# Patient Record
Sex: MALE | Race: OTHER | Employment: UNEMPLOYED | URBAN - METROPOLITAN AREA
[De-identification: names, ages, dates, MRNs, and addresses within clinical notes are randomized per-mention and may not be internally consistent; named-entity substitution may affect disease eponyms.]

---

## 2017-10-24 ENCOUNTER — APPOINTMENT (OUTPATIENT)
Dept: OCCUPATIONAL MEDICINE | Age: 22
End: 2017-10-24
Attending: EMERGENCY MEDICINE

## 2018-02-07 ENCOUNTER — APPOINTMENT (OUTPATIENT)
Dept: OCCUPATIONAL MEDICINE | Age: 23
End: 2018-02-07
Attending: PEDIATRICS

## 2023-01-10 ENCOUNTER — APPOINTMENT (OUTPATIENT)
Dept: GENERAL RADIOLOGY | Age: 28
End: 2023-01-10
Attending: NURSE PRACTITIONER
Payer: COMMERCIAL

## 2023-01-10 ENCOUNTER — APPOINTMENT (OUTPATIENT)
Dept: ULTRASOUND IMAGING | Age: 28
End: 2023-01-10
Attending: NURSE PRACTITIONER
Payer: COMMERCIAL

## 2023-01-10 ENCOUNTER — HOSPITAL ENCOUNTER (OUTPATIENT)
Age: 28
Discharge: HOME | End: 2023-01-10
Payer: COMMERCIAL

## 2023-01-10 VITALS
TEMPERATURE: 99 F | HEART RATE: 91 BPM | SYSTOLIC BLOOD PRESSURE: 161 MMHG | OXYGEN SATURATION: 99 % | RESPIRATION RATE: 18 BRPM | DIASTOLIC BLOOD PRESSURE: 101 MMHG

## 2023-01-10 DIAGNOSIS — M25.571 ACUTE RIGHT ANKLE PAIN: ICD-10-CM

## 2023-01-10 DIAGNOSIS — M79.605 LEFT LEG PAIN: Primary | ICD-10-CM

## 2023-01-10 DIAGNOSIS — R03.0 ELEVATED BLOOD PRESSURE READING: ICD-10-CM

## 2023-01-10 DIAGNOSIS — S72.91XA CLOSED FRACTURE OF RIGHT FEMUR, UNSPECIFIED FRACTURE MORPHOLOGY, UNSPECIFIED PORTION OF FEMUR, INITIAL ENCOUNTER (HCC): ICD-10-CM

## 2023-01-10 PROCEDURE — 73552 X-RAY EXAM OF FEMUR 2/>: CPT | Performed by: NURSE PRACTITIONER

## 2023-01-10 PROCEDURE — 73502 X-RAY EXAM HIP UNI 2-3 VIEWS: CPT | Performed by: NURSE PRACTITIONER

## 2023-01-10 PROCEDURE — 99214 OFFICE O/P EST MOD 30 MIN: CPT | Performed by: NURSE PRACTITIONER

## 2023-01-10 PROCEDURE — 96372 THER/PROPH/DIAG INJ SC/IM: CPT | Performed by: NURSE PRACTITIONER

## 2023-01-10 PROCEDURE — 73610 X-RAY EXAM OF ANKLE: CPT | Performed by: NURSE PRACTITIONER

## 2023-01-10 PROCEDURE — 93970 EXTREMITY STUDY: CPT | Performed by: NURSE PRACTITIONER

## 2023-01-10 RX ORDER — CYCLOBENZAPRINE HCL 10 MG
10 TABLET ORAL 2 TIMES DAILY PRN
Qty: 20 TABLET | Refills: 0 | Status: SHIPPED | OUTPATIENT
Start: 2023-01-10

## 2023-01-10 RX ORDER — KETOROLAC TROMETHAMINE 30 MG/ML
30 INJECTION, SOLUTION INTRAMUSCULAR; INTRAVENOUS ONCE
Status: COMPLETED | OUTPATIENT
Start: 2023-01-10 | End: 2023-01-10

## 2023-01-10 RX ORDER — IBUPROFEN 600 MG/1
600 TABLET ORAL EVERY 6 HOURS PRN
Qty: 40 TABLET | Refills: 0 | Status: SHIPPED | OUTPATIENT
Start: 2023-01-10

## 2023-01-10 RX ORDER — LIDOCAINE 50 MG/G
1 PATCH TOPICAL EVERY 24 HOURS
Qty: 15 PATCH | Refills: 0 | Status: SHIPPED | OUTPATIENT
Start: 2023-01-10

## 2023-01-10 NOTE — ED QUICK NOTES
Patient left the IC in stable condition via PV to [facility] IC for CT. Instructed patient to go straight to facility. She verbalized understanding of instructions given.

## 2023-01-10 NOTE — ED INITIAL ASSESSMENT (HPI)
Pt reports left leg pain (knee up to hip) beginning about Tuesday. No injury or trauma. Hx of rickets as child- has metal bassem in leg. Pt also reports right ankle pain since Tuesday. Recently finished prednisone for gout. No injury or trauma. Took tylenol this morning with minimal relief.

## 2023-01-10 NOTE — DISCHARGE INSTRUCTIONS
Follow-up with Dr. Antwan Blacwkell for primary care, rechecking of your blood pressure and annual exam and blood work. They will determine if you actually do have hypertension and if this needs to be treated. Follow-up with Dr. Dominique Lang and orthopedic office for continued evaluation and pain control. They will determine the next best steps for your right incidental femur fracture noted on x-ray  Use crutches especially when at home to get around. Take Flexeril 2 times a day as needed for pain. This is a muscle relaxer that may cause dizziness or drowsiness. If you are working during the day only take this at night before bedtime. Use Lidoderm patches to area of most pain 12 hours on and 12 hours off    Take ibuprofen 600 mg every 6 hours for pain and swelling. Avoid heavy lifting, strenuous activities. Go to ER for worsening pain, loss of sensation or mobility to legs, dizziness, chest pain or shortness of breath.

## 2023-01-11 ENCOUNTER — TELEPHONE (OUTPATIENT)
Dept: ORTHOPEDICS CLINIC | Facility: CLINIC | Age: 28
End: 2023-01-11

## 2023-01-11 NOTE — TELEPHONE ENCOUNTER
Per pt was seen at [facility] yesterday, has femur fracture, needs appt soon. Please advise thank you.

## 2023-01-11 NOTE — TELEPHONE ENCOUNTER
S/w pt and he states he started having pain in LLE about 1 wk ago. He went to  on 1/10/23 and they did XRs and found a fx in the right femur. He denies any pain in the RLE. Scheduled appt on 1/12 @ 11am with Dr Vijay Fajardo. Address given.

## 2023-01-12 ENCOUNTER — OFFICE VISIT (OUTPATIENT)
Dept: ORTHOPEDICS CLINIC | Facility: CLINIC | Age: 28
End: 2023-01-12

## 2023-01-12 DIAGNOSIS — M16.7 OTHER SECONDARY OSTEOARTHRITIS OF LEFT HIP: ICD-10-CM

## 2023-01-12 DIAGNOSIS — M84.351A STRESS FRACTURE OF SHAFT OF RIGHT FEMUR, INITIAL ENCOUNTER: Primary | ICD-10-CM

## 2023-01-12 PROCEDURE — 99204 OFFICE O/P NEW MOD 45 MIN: CPT | Performed by: ORTHOPAEDIC SURGERY

## 2024-02-05 ENCOUNTER — HOSPITAL ENCOUNTER (OUTPATIENT)
Age: 29
Discharge: HOME | End: 2024-02-05
Payer: COMMERCIAL

## 2024-02-05 ENCOUNTER — APPOINTMENT (OUTPATIENT)
Dept: GENERAL RADIOLOGY | Age: 29
End: 2024-02-05
Attending: NURSE PRACTITIONER
Payer: COMMERCIAL

## 2024-02-05 VITALS
SYSTOLIC BLOOD PRESSURE: 173 MMHG | HEART RATE: 85 BPM | DIASTOLIC BLOOD PRESSURE: 101 MMHG | OXYGEN SATURATION: 97 % | RESPIRATION RATE: 14 BRPM | TEMPERATURE: 99 F

## 2024-02-05 DIAGNOSIS — S62.657A CLOSED NONDISPLACED FRACTURE OF MIDDLE PHALANX OF LEFT LITTLE FINGER, INITIAL ENCOUNTER: ICD-10-CM

## 2024-02-05 DIAGNOSIS — M79.643 PAIN, HAND: Primary | ICD-10-CM

## 2024-02-05 PROCEDURE — A4570 SPLINT: HCPCS | Performed by: NURSE PRACTITIONER

## 2024-02-05 PROCEDURE — 99213 OFFICE O/P EST LOW 20 MIN: CPT | Performed by: NURSE PRACTITIONER

## 2024-02-05 PROCEDURE — 73130 X-RAY EXAM OF HAND: CPT | Performed by: NURSE PRACTITIONER

## 2024-02-05 NOTE — DISCHARGE INSTRUCTIONS
REFER TO HANDOUT FOR FURTHER DISCHARGE CARE.  -Take medications as directed for pain relief. Take tylenol and/or ibuprofen for pain and fever  -Apply ice or heat to Left pinky finger to relieve pain.    -Non-weight bearing until you follow up in ortho clinic  -Remove splint immediately if your finger is  cold, turning blue, decreased capillary refill,  increased pain out of proportion to your injury

## 2024-02-12 ENCOUNTER — OFFICE VISIT (OUTPATIENT)
Dept: SURGERY | Facility: CLINIC | Age: 29
End: 2024-02-12

## 2024-02-12 DIAGNOSIS — S62.647A CLOSED NONDISPLACED FRACTURE OF PROXIMAL PHALANX OF LEFT LITTLE FINGER, INITIAL ENCOUNTER: Primary | ICD-10-CM

## 2024-02-12 PROCEDURE — 99204 OFFICE O/P NEW MOD 45 MIN: CPT | Performed by: PLASTIC SURGERY

## 2024-02-12 PROCEDURE — 29125 APPL SHORT ARM SPLINT STATIC: CPT | Performed by: OCCUPATIONAL THERAPIST

## 2024-02-12 NOTE — H&P
Jacoby Yoder Jr. is a 28 year old male that presents with   Chief Complaint   Patient presents with    Fracture     lEFT SMALL FINGER    .    REFERRED BY:  Grecia Falk    Pacemaker: No  Latex Allergy: no  Coumadin: No  Plavix: No  Other anticoagulants: No  Diet medication: No  Cardiac stents: No    HAND DOMINANCE:  Right    Profession:      RECONSTRUCTIVE HISTORY    SUN EXPOSURE   Current yes   Past yes   Sunburns no   Tanning salons current no   Tanning salons past no     SKIN CANCER    Personal history of skin cancer: none      HPI:       Injury 1: LSF PP cortical fracture  - Date: 02/04/24  - Days Since: 8      28-year-old male right-hand-dominant with LSF fracture    Fell on his outstretched hand  Immediate care following day, x-rayed and splinted    No pain            Review of Systems:   Constitutional: No change in appetite, chill/rigors, or fatigue  GI: No jaundice  Endocrine: No generalized weakness  Neurological: No aphasia, loss of consciousness, or seizures    Musculoskeletal:      Date of injury 2/4/24     Location left      small finger      Mechanism Mechanical fall      Treatment Seen in immediate care on 2/5, x-ray performed, splinted       Pain  no     Numbness None      PMH:     MEDICAL  Past Medical History:   Diagnosis Date    Essential hypertension     Gout     Rickets         SURGICAL  History reviewed. No pertinent surgical history.     ALLERIGIES  No Known Allergies     MEDICATIONS  Current Outpatient Medications   Medication Sig Dispense Refill    cyclobenzaprine 10 MG Oral Tab Take 1 tablet (10 mg total) by mouth 2 (two) times daily as needed for Muscle spasms. (Patient not taking: Reported on 2/12/2024) 20 tablet 0    ibuprofen 600 MG Oral Tab Take 1 tablet (600 mg total) by mouth every 6 (six) hours as needed for Pain or Fever. (Patient not taking: Reported on 2/12/2024) 40 tablet 0        SOCIAL HISTORY  Social History     Socioeconomic History    Marital status:     Tobacco Use    Smoking status: Never    Smokeless tobacco: Never   Substance and Sexual Activity    Alcohol use: Yes     Comment: socially    Drug use: Never        FAMILY HISTORY  History reviewed. No pertinent family history.       PHYSICAL EXAM:     CONSTITUTIONAL: Overall appearance - Normal  HEENT: Normocephalic  EYES: Conjunctiva - Right: Normal, Left: Normal; EOMI  EARS: Inspection - Right: Normal, Left: Normal  NECK/THYROID: Inspection - Normal, Palpation - Normal, Thyroid gland - Normal, No adenopathy  RESPIRATORY: Inspection - Normal, Effort - Normal  CARDIOVASCULAR: Regular rhythm, No murmurs  ABDOMEN: Inspection - Normal, No abdominal tenderness  NEURO: Memory intact  PSYCH: Oriented to person, place, time, and situation, Appropriate mood and affect      Hand Physical Exam:     LSF ecchymosis and edema with limited range of motion  FDS, FDP, central slip, terminal slip intact  PIP/DIP   RCL/UCL intact    No rotation on flexion    X-ray independently interpreted: Cortical fracture proximal phalanx base    ASSESSMENT/PLAN:     Fracture: LSF proximal phalanx base, cortical fracture    We discussed the fracture/dislocation and the treatment options.  Questions were answered and the patient wishes to proceed with treatment.     SPLINT - This fracture can be treated with a splint.  We discussed splint care and instructions, as well as restrictions.  If there is displacement of the fracture, surgery may be required.    Ulnar gutter splint    1 week start active, passive, strengthening and resistance  2 weeks discontinue splint, back to work full duty, 2/26    We discussed restrictions.    Even with satisfactory healing, the hand/digit may not regain normal ROM or normal function.              2/12/2024  Manjeet Schneider MD      +++++++++++++++++++++++++++++++++++++++++      MEDICAL DECISION MAKING    PROBLEMS      MODERATE    (number / complexity)          Acute complicated  injury    DATA         MODERATE    (amount / complexity)          X-rays independently reviewed    MANAGEMENT RISK  LOW    (complications/ morbidity)       Splint/OT                  MDM LEVEL    MODERATE

## 2024-02-13 NOTE — PROGRESS NOTES
Subjective: I hurt my LSF.      Objective:     Current level of performance:  ADL: Independent  Work: 2  lifting restriction with the left hand.  Leisure: Not addressed    Measurements/Tests:  ROM:      N/A            Treatment Provided this day: Fabricated a left Ulnar Gutter Splint per order.    Treatment Time: 30 minutes      Summary/Analysis of Treatment session: Tolerated the fabrication of a left ulnar gutter splint well.      Plan: To be compliant with the wear and care of left ulnar gutter splint x 2 weeks:      Follow up in:  x 1 week.          Richar VALLECILLO/L

## 2024-02-20 ENCOUNTER — OFFICE VISIT (OUTPATIENT)
Dept: SURGERY | Facility: CLINIC | Age: 29
End: 2024-02-20

## 2024-02-20 DIAGNOSIS — M25.642 STIFFNESS OF JOINT, HAND, LEFT: ICD-10-CM

## 2024-02-20 DIAGNOSIS — M62.81 DISTAL MUSCLE WEAKNESS: Primary | ICD-10-CM

## 2024-02-20 PROCEDURE — 97166 OT EVAL MOD COMPLEX 45 MIN: CPT | Performed by: OCCUPATIONAL THERAPIST

## 2024-02-20 NOTE — PROGRESS NOTES
OCCUPATIONAL THERAPY EVALUATION:   Jacoby Yoder Jr.   [id number]       SUBJECTIVE:    HX of Injury: LSF PP Cortical Fracture.  Chief Complaint:  Minimal discomfort of the LSF.    Precautions:  2 # lifting restriction with the left hand.  Premorbid Functional Status: Independent w/ Occ. duties, Independent w/ driving / sitting, Independent w/ ADL's  Current Level of Function: On restricted work duty.  Employment: Working restricted duty  Hand Dominance: right  Living Situation:  Not addressed  Barriers to Learning: None  Patient Goals: To return to full work duty.    Imaging/Tests: X-ray        OBJECTIVE DATA:   PAIN:   Rating (1/10): 1/10 at rest, 2/10 with activity  Location:       ORTHOTICS:    Left Ulnar Gutter splint for x 1 more week.      AROM/PROM:  (Degrees)  LEFT HAND:    Thumb IF MF RF SF   MP     80   PIP     80   DIP     40   TANNER     200 degrees of TANNER             STRENGTH: (lbs) Right Average Left Average   : NT NT   2 pt Pinch:     3 pt Pinch:     Lateral Pinch:         ASSESSMENT & PLAN OF CARE:    Treatment Provided: Patient was seen for an initial evaluation, hand washing :  HEP:  AROM, Tendon glides, x 20 reps per set, x 5 sets daily. Reviewed hand elevation importance. Written handout was provided to reinforce today's treatment and educational session.       Rehabilitation Potential: Good    CLINICAL ASSESSMENT:    Patient/Caregiver Education Provided: Yes    Treatment Plan:  Therapeutic Exercise  Therapeutic Activities  Modalities  Patient/Family Education  Splinting: Left Ulnar Gutter Splint.    GOALS:  Short term goals to be reached in x 1 week:    1) Independent with HEP..    Long term goals to be reached by 02/26/2024:    1) 1) Full functional use of the involved extremity for self-care, leisure and work related tasks:        Patient will be seen 1 x /week for 3 weeks or a total of 3 visits.   Pt. was advised regarding the findings of this evaluation and agrees to the plan of care.      DARRYL Lynch     I have reviewed the treatment plan and concur.   Manjeet Schneider MD

## 2024-04-26 ENCOUNTER — HOSPITAL ENCOUNTER (OUTPATIENT)
Age: 29
Discharge: HOME | End: 2024-04-26
Payer: COMMERCIAL

## 2024-04-26 VITALS
TEMPERATURE: 98 F | SYSTOLIC BLOOD PRESSURE: 175 MMHG | RESPIRATION RATE: 16 BRPM | HEART RATE: 88 BPM | DIASTOLIC BLOOD PRESSURE: 122 MMHG | OXYGEN SATURATION: 99 %

## 2024-04-26 DIAGNOSIS — M79.671 FOOT PAIN, RIGHT: Primary | ICD-10-CM

## 2024-04-26 DIAGNOSIS — R03.0 ELEVATED BLOOD PRESSURE READING: ICD-10-CM

## 2024-04-26 PROCEDURE — 99213 OFFICE O/P EST LOW 20 MIN: CPT | Performed by: NURSE PRACTITIONER

## 2024-04-26 NOTE — ED INITIAL ASSESSMENT (HPI)
Pt complaining of right ankle pain with flexion and walking since yesterday.  No trauma. Pt took ibuprofen today and now does not have pain.

## 2024-04-26 NOTE — ED PROVIDER NOTES
Patient Seen in: [facility]    History   CC: ankle/foot pain  HPI: Jacoby Yoder . 29 year old male w/ HTN, and hx of surgical correction for rickets who presents c/o right anterior foot/ankle pain which started after getting up early morning today. Increased with ambulation and to the touch. Denies pain at present.  Denies known injury/trauma.  Denies numbness, tingling, weakness or limitation in range of motion associated.    Past Medical History:    Essential hypertension    Gout    Rickets       History reviewed. No pertinent surgical history.    History reviewed. No pertinent family history.    Social History     Socioeconomic History    Marital status:    Tobacco Use    Smoking status: Never    Smokeless tobacco: Never   Substance and Sexual Activity    Alcohol use: Yes     Comment: socially    Drug use: Never     Social Determinants of Health     Financial Resource Strain: Not on File (10/7/2022)    Received from [facility]     Financial Resource Strain     Financial Resource Strain: 0   Food Insecurity: Not on File (10/7/2022)    Received from [facility]     Food Insecurity     Food: 0   Transportation Needs: Not on File (10/7/2022)    Received from [facility]     Transportation Needs     Transportation: 0   Physical Activity: Not on File (10/7/2022)    Received from [facility]     Physical Activity     Physical Activity: 0   Stress: Not on File (10/7/2022)    Received from [facility]     Stress     Stress: 0   Social Connections: Not on File (10/7/2022)    Received from [facility]     Social Connections     Social Connections and Isolation: 0   Housing Stability: Not on File (10/7/2022)    Received from [facility]     Housing Stability     Housin       ROS:  Review of Systems    Positive for stated complaint: Ankle Pain  Other systems are as noted in HPI.  Constitutional and vital signs reviewed.      All other systems reviewed and negative except as noted  above.    PSFH elements reviewed from today and agreed except as otherwise stated in HPI.             Constitutional and vital signs reviewed.        Physical Exam     ED Triage Vitals [04/26/24 1152]   BP (!) 175/122   Pulse 88   Resp 16   Temp 97.5 °F (36.4 °C)   Temp src Temporal   SpO2 99 %   O2 Device None (Room air)       Current:BP (!) 175/122   Pulse 88   Temp 97.5 °F (36.4 °C) (Temporal)   Resp 16   SpO2 99%         PE:  General - Appears well, non-toxic and in NAD  Head - Appears symmetrical without deformity/swelling cranium, scalp, or facial bones  Resp - Lung sounds clear bilaterally and wob unlabored, good aeration with equal, even expansion bilaterally   CV - RRR  Skin - no rashes or petechiae noted, pink warm and dry throughout, mmm, cap refill <2seconds  Neuro - A&O x4, sensation equal to both medial and lateral aspects of lower extremities, steady gait  MSK - makes purposeful movements of lower extremities with full ROM noted, foot press/dorsiflexion strength equal bilat, pedal pulses 2+ bilat.  There is no reproducible tenderness elicited with palpation diffuse to the right foot, ankle, shin, calf, knee or thigh  Psych - Interactive and appropriate      ED Course   Labs Reviewed - No data to display    MDM     DDx: Sprain versus strain versus contusion versus gout versus fracture    Patient initially to immediate care for foot pain which occurred this morning and resolved prior to arrival.  Did take Motrin earlier this morning.  Offered x-ray which patient declines.  There is no obvious sign of infection, swelling and no tenderness to palpation on exam.  No reproducible pain at present.  Patient is noted to have high blood pressure however at immediate care.  States he has been told this in the past however does not have a primary care provider.  He is asymptomatic of hypertension at this time.  Declines ED visit for blood pressure reduction.  Agreeable to close outpatient follow-up.  First  available appointment Monday morning at 8:40 AM.  This provider called to secure this appointment.  Patient is agreeable.  Strict ED precautions prior to this appointment reviewed.  Patient is historian and demonstrates understanding of all instruction and agrees with plan of care.      Disposition and Plan     Clinical Impression:  1. Foot pain, right    2. Elevated blood pressure reading        Disposition:  Discharge    Follow-up:  Jonas Trevizo MD  [address] [place] [id number]  [phone]    Go on 4/29/2024  0840 - arrive by 0825      Medications Prescribed:  Current Discharge Medication List

## 2024-04-26 NOTE — DISCHARGE INSTRUCTIONS
Follow-up with your appointment with internal medicine as previously made on Monday.  Arrive by 825 and have your insurance card with you.  Bring photo ID.  Seek reevaluation sooner in the emergency room for any new or worsening symptoms, dizziness, vision changes, weakness, vomiting or headaches.

## 2024-04-29 ENCOUNTER — OFFICE VISIT (OUTPATIENT)
Dept: INTERNAL MEDICINE CLINIC | Facility: CLINIC | Age: 29
End: 2024-04-29

## 2024-04-29 ENCOUNTER — LAB ENCOUNTER (OUTPATIENT)
Dept: LAB | Age: 29
End: 2024-04-29
Attending: STUDENT IN AN ORGANIZED HEALTH CARE EDUCATION/TRAINING PROGRAM
Payer: COMMERCIAL

## 2024-04-29 VITALS
BODY MASS INDEX: 27.09 KG/M2 | HEIGHT: 60 IN | WEIGHT: 138 LBS | DIASTOLIC BLOOD PRESSURE: 91 MMHG | SYSTOLIC BLOOD PRESSURE: 148 MMHG | HEART RATE: 76 BPM

## 2024-04-29 DIAGNOSIS — I15.9 SECONDARY HYPERTENSION: ICD-10-CM

## 2024-04-29 DIAGNOSIS — Z00.00 ANNUAL PHYSICAL EXAM: ICD-10-CM

## 2024-04-29 DIAGNOSIS — R63.4: ICD-10-CM

## 2024-04-29 DIAGNOSIS — R50.9 FEVER OF UNKNOWN ORIGIN (FUO): ICD-10-CM

## 2024-04-29 DIAGNOSIS — F10.20 ALCOHOL USE DISORDER, SEVERE, DEPENDENCE (HCC): ICD-10-CM

## 2024-04-29 DIAGNOSIS — M90.80: ICD-10-CM

## 2024-04-29 DIAGNOSIS — M10.9 GOUT INVOLVING TOE, UNSPECIFIED CAUSE, UNSPECIFIED CHRONICITY, UNSPECIFIED LATERALITY: ICD-10-CM

## 2024-04-29 DIAGNOSIS — E83.31: ICD-10-CM

## 2024-04-29 DIAGNOSIS — Z23 IMMUNIZATION DUE: ICD-10-CM

## 2024-04-29 DIAGNOSIS — G47.00 INSOMNIA, UNSPECIFIED TYPE: ICD-10-CM

## 2024-04-29 DIAGNOSIS — Z23 NEED FOR TETANUS, DIPHTHERIA, AND ACELLULAR PERTUSSIS (TDAP) VACCINE: ICD-10-CM

## 2024-04-29 DIAGNOSIS — Z76.89 ENCOUNTER TO ESTABLISH CARE WITH NEW DOCTOR: Primary | ICD-10-CM

## 2024-04-29 PROBLEM — R73.03 PRE-DIABETES: Status: ACTIVE | Noted: 2024-04-29

## 2024-04-29 PROBLEM — R73.03 PRE-DIABETES: Status: RESOLVED | Noted: 2024-04-29 | Resolved: 2024-04-29

## 2024-04-29 LAB
ALBUMIN SERPL-MCNC: 4.5 G/DL (ref 3.2–4.8)
ALBUMIN/GLOB SERPL: 1.6 {RATIO} (ref 1–2)
ALP LIVER SERPL-CCNC: 95 U/L
ALT SERPL-CCNC: 52 U/L
ANION GAP SERPL CALC-SCNC: 6 MMOL/L (ref 0–18)
AST SERPL-CCNC: 35 U/L (ref ?–34)
ATRIAL RATE: 65 BPM
BASOPHILS # BLD AUTO: 0.06 X10(3) UL (ref 0–0.2)
BASOPHILS NFR BLD AUTO: 0.8 %
BILIRUB SERPL-MCNC: 1.1 MG/DL (ref 0.3–1.2)
BILIRUB UR QL: NEGATIVE
BUN BLD-MCNC: 18 MG/DL (ref 9–23)
BUN/CREAT SERPL: 17.6 (ref 10–20)
CALCIUM BLD-MCNC: 9.8 MG/DL (ref 8.7–10.4)
CARTRIDGE EXPIRATION DATE: NORMAL DATE
CHLORIDE SERPL-SCNC: 106 MMOL/L (ref 98–112)
CLARITY UR: CLEAR
CO2 SERPL-SCNC: 27 MMOL/L (ref 21–32)
COLOR UR: YELLOW
CREAT BLD-MCNC: 1.02 MG/DL
CREAT UR-SCNC: 181.9 MG/DL
DEPRECATED RDW RBC AUTO: 39.8 FL (ref 35.1–46.3)
EGFRCR SERPLBLD CKD-EPI 2021: 101 ML/MIN/1.73M2 (ref 60–?)
EOSINOPHIL # BLD AUTO: 0.39 X10(3) UL (ref 0–0.7)
EOSINOPHIL NFR BLD AUTO: 5.2 %
ERYTHROCYTE [DISTWIDTH] IN BLOOD BY AUTOMATED COUNT: 11.6 % (ref 11–15)
FASTING STATUS PATIENT QL REPORTED: YES
GLOBULIN PLAS-MCNC: 2.8 G/DL (ref 2.8–4.4)
GLUCOSE BLD-MCNC: 94 MG/DL (ref 70–99)
GLUCOSE UR-MCNC: 70 MG/DL
HCT VFR BLD AUTO: 51.3 %
HEMOGLOBIN A1C: 4.6 % (ref 4.3–5.6)
HGB BLD-MCNC: 18.9 G/DL
IGA SERPL-MCNC: 298.9 MG/DL (ref 40–350)
IGM SERPL-MCNC: 97.4 MG/DL (ref 50–300)
IMM GRANULOCYTES # BLD AUTO: 0.07 X10(3) UL (ref 0–1)
IMM GRANULOCYTES NFR BLD: 0.9 %
IMMUNOGLOBULIN PNL SER-MCNC: 800 MG/DL (ref 650–1600)
KETONES UR-MCNC: NEGATIVE MG/DL
LEUKOCYTE ESTERASE UR QL STRIP.AUTO: NEGATIVE
LYMPHOCYTES # BLD AUTO: 1.33 X10(3) UL (ref 1–4)
LYMPHOCYTES NFR BLD AUTO: 17.9 %
MCH RBC QN AUTO: 34.3 PG (ref 26–34)
MCHC RBC AUTO-ENTMCNC: 36.8 G/DL (ref 31–37)
MCV RBC AUTO: 93.1 FL
MICROALBUMIN UR-MCNC: 244.3 MG/DL
MICROALBUMIN/CREAT 24H UR-RTO: 1343 UG/MG (ref ?–30)
MONOCYTES # BLD AUTO: 0.81 X10(3) UL (ref 0.1–1)
MONOCYTES NFR BLD AUTO: 10.9 %
NEUTROPHILS # BLD AUTO: 4.78 X10 (3) UL (ref 1.5–7.7)
NEUTROPHILS # BLD AUTO: 4.78 X10(3) UL (ref 1.5–7.7)
NEUTROPHILS NFR BLD AUTO: 64.3 %
NITRITE UR QL STRIP.AUTO: NEGATIVE
OSMOLALITY SERPL CALC.SUM OF ELEC: 290 MOSM/KG (ref 275–295)
P AXIS: 53 DEGREES
P-R INTERVAL: 138 MS
PH UR: 6.5 [PH] (ref 5–8)
PLATELET # BLD AUTO: 238 10(3)UL (ref 150–450)
POTASSIUM SERPL-SCNC: 3.9 MMOL/L (ref 3.5–5.1)
PROT SERPL-MCNC: 7.3 G/DL (ref 5.7–8.2)
PROT UR-MCNC: 300 MG/DL
PTH-INTACT SERPL-MCNC: 123.9 PG/ML (ref 18.5–88)
Q-T INTERVAL: 378 MS
QRS DURATION: 82 MS
QTC CALCULATION (BEZET): 393 MS
R AXIS: 50 DEGREES
RBC # BLD AUTO: 5.51 X10(6)UL
SODIUM SERPL-SCNC: 139 MMOL/L (ref 136–145)
SP GR UR STRIP: 1.02 (ref 1–1.03)
T AXIS: 52 DEGREES
TSI SER-ACNC: 1.22 MIU/ML (ref 0.55–4.78)
URATE SERPL-MCNC: 8.6 MG/DL
UROBILINOGEN UR STRIP-ACNC: NORMAL
VENTRICULAR RATE: 65 BPM
VIT D+METAB SERPL-MCNC: 9.4 NG/ML (ref 30–100)
WBC # BLD AUTO: 7.4 X10(3) UL (ref 4–11)

## 2024-04-29 PROCEDURE — 84443 ASSAY THYROID STIM HORMONE: CPT

## 2024-04-29 PROCEDURE — 93005 ELECTROCARDIOGRAM TRACING: CPT

## 2024-04-29 PROCEDURE — 80053 COMPREHEN METABOLIC PANEL: CPT

## 2024-04-29 PROCEDURE — 84166 PROTEIN E-PHORESIS/URINE/CSF: CPT

## 2024-04-29 PROCEDURE — 82570 ASSAY OF URINE CREATININE: CPT

## 2024-04-29 PROCEDURE — 36415 COLL VENOUS BLD VENIPUNCTURE: CPT

## 2024-04-29 PROCEDURE — 87389 HIV-1 AG W/HIV-1&-2 AB AG IA: CPT | Performed by: STUDENT IN AN ORGANIZED HEALTH CARE EDUCATION/TRAINING PROGRAM

## 2024-04-29 PROCEDURE — 87040 BLOOD CULTURE FOR BACTERIA: CPT | Performed by: STUDENT IN AN ORGANIZED HEALTH CARE EDUCATION/TRAINING PROGRAM

## 2024-04-29 PROCEDURE — 93010 ELECTROCARDIOGRAM REPORT: CPT | Performed by: INTERNAL MEDICINE

## 2024-04-29 PROCEDURE — 84550 ASSAY OF BLOOD/URIC ACID: CPT

## 2024-04-29 PROCEDURE — 84100 ASSAY OF PHOSPHORUS: CPT

## 2024-04-29 PROCEDURE — 83521 IG LIGHT CHAINS FREE EACH: CPT

## 2024-04-29 PROCEDURE — 86334 IMMUNOFIX E-PHORESIS SERUM: CPT

## 2024-04-29 PROCEDURE — 82784 ASSAY IGA/IGD/IGG/IGM EACH: CPT

## 2024-04-29 PROCEDURE — 83970 ASSAY OF PARATHORMONE: CPT

## 2024-04-29 PROCEDURE — 82043 UR ALBUMIN QUANTITATIVE: CPT

## 2024-04-29 PROCEDURE — 86480 TB TEST CELL IMMUN MEASURE: CPT | Performed by: STUDENT IN AN ORGANIZED HEALTH CARE EDUCATION/TRAINING PROGRAM

## 2024-04-29 PROCEDURE — 86335 IMMUNFIX E-PHORSIS/URINE/CSF: CPT

## 2024-04-29 PROCEDURE — 82306 VITAMIN D 25 HYDROXY: CPT

## 2024-04-29 PROCEDURE — 81001 URINALYSIS AUTO W/SCOPE: CPT

## 2024-04-29 PROCEDURE — 84165 PROTEIN E-PHORESIS SERUM: CPT

## 2024-04-29 PROCEDURE — 84156 ASSAY OF PROTEIN URINE: CPT

## 2024-04-29 PROCEDURE — 85025 COMPLETE CBC W/AUTO DIFF WBC: CPT

## 2024-04-29 RX ORDER — NALTREXONE HYDROCHLORIDE 50 MG/1
50 TABLET, FILM COATED ORAL NIGHTLY
Qty: 90 TABLET | Refills: 1 | Status: SHIPPED | OUTPATIENT
Start: 2024-04-29 | End: 2024-10-26

## 2024-04-29 RX ORDER — LOSARTAN POTASSIUM 100 MG/1
100 TABLET ORAL NIGHTLY
Qty: 90 TABLET | Refills: 0 | Status: SHIPPED | OUTPATIENT
Start: 2024-04-29 | End: 2024-07-28

## 2024-04-29 NOTE — PATIENT INSTRUCTIONS
What Is High Blood Pressure?  High blood pressure (hypertension) is known as the silent killer. This is because most of the time it doesn’t cause symptoms. In fact, many people don’t know they have it until other health problems develop.  High blood pressure is diagnosed when blood pressure readings taken at several different times show levels higher than 130/80 mmHg. Healthy changes can help lower blood pressure. But once you have high blood pressure, you'll need to manage it for the rest of your life.  Understanding blood pressure  The circulatory system is made up of the heart and blood vessels that carry blood through the body. Your heart is the pump for this system. With each heartbeat (contraction), the heart sends blood out through large blood vessels called arteries. Blood pressure is a measure of how hard the moving blood pushes against the walls of the arteries.  High blood pressure can harm your health  In a healthy artery, the blood moves smoothly and puts normal pressure on its walls.    High blood pressure results from blood pushing too hard against artery walls. This damages the walls, which form scar tissue as they heal. The scar tissue makes the arteries stiff and weak. Plaque also sticks to the scar tissue, making arteries narrower and harder..  High blood pressure:  Causes your heart to work harder to get blood out to the rest of your body  Raises your risk for heart attack, heart failure, and stroke  Can lead to kidney disease and blindness  Measuring blood pressure  It's important to know your blood pressure numbers. A blood pressure reading is given as two numbers, such as 120/70. The top number is the pressure of blood against the artery walls during a heartbeat (systolic). The bottom number is the pressure of blood against artery walls between heartbeats (diastolic).  Blood pressure categories are:  Normal: lower than 120/lower than 80 mmHg  Elevated (prehypertensive): 120-139/80-89  mmHg  High, Stage 1: 130-139/80-89 mmHg  High, Stage 2: 140/90 mmHg and higher  For most people with high blood pressure, keeping readings lower than 130/80 mmHg may help prevent health problems. Talk with your healthcare provider to find out what your blood pressure goals should be. Bring up any questions or concerns you have about your readings.  Controlling blood pressure  If your blood pressure is too high, work with your healthcare provider to make a plan for lowering it. They may prescribe medicine to help control your blood pressure if lifestyle changes aren't enough.  Below are changes you can make to help lower your blood pressure:  Choose heart-healthy foods. Ask your provider about the Dietary Approaches to Stop Hypertension (DASH) eating plan. DASH limits sodium (salt) and offers lots of fruits and vegetables, low-fat or nonfat dairy products, whole grains, and other foods high in fiber and low in fat. This plan also includes an increased amount of potassium, which can help lower blood pressure.  Reduce sodium. Eating less sodium reduces fluid retention. Fluid retention caused by too much salt increases blood volume and blood pressure. The American Heart Association (AHA) says to have no more than 1,500 mg sodium a day. But because Americans eat so much salt, the AHA says cutting back to even 2,300 mg a day offers benefits.  Stay at a healthy weight. Being overweight makes you more likely to have high blood pressure. Losing excess weight helps lower blood pressure.  Exercise regularly. Daily exercise helps your heart and blood vessels work better and stay healthier. It can help lower your blood pressure.  Do not smoke. Smoking increases blood pressure and damages blood vessels.  Limit alcohol. Drinking too much alcohol can raise blood pressure. Men should have no more than 2 drinks a day. Women should have no more than 1 a day. A drink is equal to 1 beer, a small glass of wine, or a shot of  liquor.  Control stress. Stress makes your heart work harder and beat faster. Managing stress in a healthy way helps you control your blood pressure.  Facts about high blood pressure  Feeling OK doesn't mean your blood pressure is under control. Likewise, feeling bad doesn’t mean it’s out of control. The only way to know for sure is to check your pressure regularly.  Medicine is only one part of controlling high blood pressure. You also need to manage your weight, get regular exercise, and adjust your eating habits.  High blood pressure is often a lifelong health problem. But it can be controlled with lifestyle changes and medicine.  Hypertension isn't the same as stress. Although stress may be a factor in high blood pressure, it’s only one part of the story.  Blood pressure medicines need to be taken every day. Stopping suddenly may cause a dangerous increase in pressure.    [employer] last reviewed this educational content on 2/1/2022 © 2000-2023 [employer]. All rights reserved. This information is not intended as a substitute for professional medical care. Always follow your healthcare professional's instructions.        What Is a Normal Blood Pressure?  The Joint National Committee on Prevention, Detection, Evaluation, and Treatment of High Blood Pressure has classified blood pressure measurements into several categories:  Normal blood pressure is systolic pressure less than 120 and diastolic pressure less than 80 mmHg.   \"Prehypertension\" is systolic pressure of 120-139 or diastolic pressure of 80-89 mmHg.   Stage 1 Hypertension is blood pressure greater than systolic pressure of 140-159 or diastolic pressure of 90-99 mmHg or greater.   Stage 2 Hypertension is systolic pressure of 160 or greater or diastolic pressure of 100 or greater.  What Health Problems Are Associated With High Blood Pressure?  Several potentially serious health conditions are linked to high blood pressure,  including:  Atherosclerosis: a disease of the arteries caused by a buildup of plaque, or fatty material, on the inside walls of the blood vessels; hypertension contributes to this buildup by putting added stress and force on the artery walls.   Heart Disease: Heart failure (the heart is not strong enough to pump blood adequately), ischemic heart disease (the heart tissue doesn't get enough blood), and hypertensive hypertrophic cardiomyopathy (thickened, abnormally functioning heart muscle) are all associated with high blood pressure.   Kidney Disease: Hypertension can damage the blood vessels and filters in the kidneys, so that the kidneys cannot excrete waste properly.   Stroke: Hypertension can lead to stroke, either by contributing to the process of atherosclerosis (which can lead to blockages and/or clots), or by weakening the blood vessel wall and causing it to rupture.   Eye Disease: Hypertension can damage the very small blood vessels in the retina.  Bleeding from the aorta, the large blood vessel that supplies blood to the abdomen, pelvis, and legs   Heart failure   Poor blood supply to the legs  Erectile Dysfunction  Problems with your vision    Overview  \"Blood pressure\" is the force of blood pushing against the walls of the arteries as the heart pumps blood. If this pressure rises and stays high over time, it can damage the body in many ways.  About 1 in 3 adults in the [place] has HBP. The condition itself usually has no signs or symptoms. You can have it for years without knowing it. During this time, though, HBP can damage your heart, blood vessels, kidneys, and other parts of your body.  Knowing your blood pressure numbers is important, even when you're feeling fine. If your blood pressure is normal, you can work with your health care team to keep it that way. If your blood pressure is too high, treatment may help prevent damage to your body's organs.    By [facility] staff   DASH stands for  Dietary Approaches to Stop Hypertension. The DASH diet is a lifelong approach to healthy eating that's designed to help treat or prevent high blood pressure (hypertension). The DASH diet encourages you to reduce the sodium in your diet and eat a variety of foods rich in nutrients that help lower blood pressure, such as potassium, calcium and magnesium.   Lifestyle Modification:   (AVG. SBP = Average Systolic Blood Pressure)  Lifestyle Modification Recommendations  Modification Recommendation Avg. SBP Reduction Range   Weight Reduction Maintain normal body weight (body mass index 18.5-24.9 kg/m2) 5-20 mmHg/10 kg   DASH eating plan Adopt a diet rich in fruits, vegetables, and low fat dairy products with reduced content of saturated and total fat. 8-14 mmHg   Dietary sodium reduction Reduce dietary sodium intake to <= 100 mmol per day (2.4 g sodium or 6 g sodium chloride) 2-8 mmHg   Aerobic physical activity Regular aerobic physical activity (e.g., brisk walking, light jogging, cycling, swimming, etc.) for a goal of at least 150 minutes per week. 4-9 mmHg   Moderation of alcohol consumption Men: limit to <= 2 drinks* per day.  Women and lighter weight persons: limit to <= 1 drink* per day. 2-4 mmHg      * 1 drink = ½ oz or 15 mL ethanol (e.g. 12 oz beer, 5 oz wine, 1.5 oz 80-proof whiskey.   Effects are dose and time dependent.

## 2024-04-29 NOTE — PROGRESS NOTES
History of Present Illness   Patient ID: Jacoby Yoder Jr. is a 29 year old male.  Chief Complaint: Hypertension, Weight Loss (Pt states in last 2 weeks has lost about 16lb without trying ), and Physical      Jacoby Yoder Jr. is a pleasant 29 year old male with PMHX of  X-linked congenital Rickets (s/p multiple LE surgeries), Hypertension (not on medications), Chronic Gout (bilateral 1st metatarsal), Heavy alcohol use disorder, who presents to [facility],for annual physical exam and other concerning findings. Jacoby Yoder Jr. States that over the past 2-3 weeks he has had ongoing unintentional weight loss (was 156lbs and now 138) 4' 9\" tall. Pt states he drinks heavy alcohol daily since age 21. Patient drinks screwdrivers every night(orange juice and vodka: 2 handles vodka a week and 6-8beers every night with 2-3 cigarettes). Patient states he hasn't changed any pattern in diet/food or alcohol intake. No reported history of withdrawal seizures. However unknown why he has unintentional weight loss all of suddenly and on/off fevers.     Pt is  and lives with other roommates, whom have 2 dogs, ferret and 2 birds. Pt has only seldom direct contact with dogs (does not change bird stool of ferret or birds/nor cleans cages).      2-3 weeks ago was 156 lbs and now 138 lbs. Unintentional dropping size.     Surgical history: multiple ankle and foot surgery for rickets  Also     Health Maintenance  - All care gaps addressed with patient.   Health Maintenance Due   Topic Date Due    Annual Physical  Never done    Pneumococcal Vaccine: Birth to 64yrs (1 of 2 - PCV) Never done    DTaP,Tdap,and Td Vaccines (1 - Tdap) Never done    COVID-19 Vaccine (3 - 2023-24 season) 09/01/2023     Review of Systems  Review of Systems   Constitutional: Negative.    Respiratory: Negative.     Cardiovascular: Negative.    Gastrointestinal: Negative.    Skin: Negative.    Neurological: Negative.        Physical Exam  Vitals:    04/29/24  0835 04/29/24 0843   BP: (!) 158/98 (!) 148/91   Pulse: 84 76   Weight: 138 lb (62.6 kg)    Height: 4' 9\" (1.448 m)      Body mass index is 29.86 kg/m².  BP Readings from Last 3 Encounters:   04/29/24 (!) 148/91   04/26/24 (!) 175/122   02/05/24 (!) 173/101     Physical Exam  Constitutional:       Appearance: He is well-developed.   Cardiovascular:      Rate and Rhythm: Normal rate and regular rhythm.      Heart sounds: Normal heart sounds.   Pulmonary:      Effort: Pulmonary effort is normal.      Breath sounds: Normal breath sounds.   Abdominal:      General: Bowel sounds are normal.      Palpations: Abdomen is soft.   Skin:     General: Skin is warm and dry.      Comments: Surgical scars to bilateral ankles.legs     Neurological:      Mental Status: He is alert and oriented to person, place, and time.      Deep Tendon Reflexes: Reflexes are normal and symmetric.           Labs & Imaging  Pertinent labs and imaging reviewed.   No results found for: \"GLU\", \"BUN\", \"BUNCREA\", \"CREATSERUM\", \"ANIONGAP\", \"GFR\", \"GFRNAA\", \"GFRAA\", \"CA\", \"OSMOCALC\", \"ALKPHO\", \"AST\", \"ALT\", \"ALKPHOS\", \"BILT\", \"TP\", \"ALB\", \"GLOBULIN\", \"AGRATIO\", \"NA\", \"K\", \"CL\", \"CO2\"  Lab Results   Component Value Date    A1C 4.6 04/29/2024     No results found for: \"WBC\", \"RBC\", \"HGB\", \"HCT\", \"MCV\", \"MCH\", \"MCHC\", \"RDW\", \"PLT\", \"MPV\"  No results found for: \"CHOLEST\", \"TRIG\", \"HDL\", \"LDL\", \"VLDL\", \"TCHDLRATIO\", \"NONHDLC\", \"CHOLHDLRATIO\", \"CALCNONHDL\"  The ASCVD Risk score (Brandan REYES, et al., 2019) failed to calculate for the following reasons:    The 2019 ASCVD risk score is only valid for ages 40 to 79    Medical History    Reviewed allergies:  No Known Allergies     Reviewed:  Patient Active Problem List    Diagnosis    Rickets, X-linked hypophosphatemic (HCC)    Secondary hypertension    Gout involving toe    Unintentional weight loss of 1-2% body weight within 1 week      Reviewed:  Past Medical History:    Essential hypertension    Gout    Rickets       Reviewed:  Family History   Problem Relation Age of Onset    Other (Rickets) Mother     Other (rickets) Brother     Other (rickets) Brother     Other (alcohol use disorder) Paternal Grandfather        Reviewed:  Past Surgical History:   Procedure Laterality Date    Ankle fracture surgery      childhood with rickets)    Femur fracture surgery  2010      Reviewed:  Social History     Socioeconomic History    Marital status:    Tobacco Use    Smoking status: Every Day     Current packs/day: 0.03     Average packs/day: (0.5 ttl pk-yrs)     Types: Cigarettes    Smokeless tobacco: Never    Tobacco comments:     3-4 cigarettes a day   Vaping Use    Vaping status: Former   Substance and Sexual Activity    Alcohol use: Not Currently     Comment: 2 handles a week (screwdrivers and 6-8 cans of beer)    Drug use: Never    Sexual activity: Yes     Partners: Female     Social Determinants of Health     Financial Resource Strain: Not on File (10/7/2022)    [facility] from [facility]     Financial Resource Strain     Financial Resource Strain: 0   Food Insecurity: Not on File (10/7/2022)    Received from [facility]     Food Insecurity     Food: 0   Transportation Needs: Not on File (10/7/2022)    Received from [facility]     Transportation Needs     Transportation: 0   Physical Activity: Not on File (10/7/2022)    Received from [facility]     Physical Activity     Physical Activity: 0   Stress: Not on File (10/7/2022)    Received from [facility]     Stress     Stress: 0   Social Connections: Not on File (10/7/2022)    [facility] from [facility]     Social Connections     Social Connections and Isolation: 0   Housing Stability: Not on File (10/7/2022)    Received from [facility]     Housing Stability     Housin      Reviewed:  Current Outpatient Medications   Medication Sig Dispense Refill    losartan 100 MG Oral Tab Take 1 tablet (100 mg total) by mouth at bedtime. 90 tablet 0    naltrexone 50 MG Oral  Tab Take 1 tablet (50 mg total) by mouth at bedtime. 90 tablet 1    cyclobenzaprine 10 MG Oral Tab Take 1 tablet (10 mg total) by mouth 2 (two) times daily as needed for Muscle spasms. (Patient not taking: Reported on 2/12/2024) 20 tablet 0    ibuprofen 600 MG Oral Tab Take 1 tablet (600 mg total) by mouth every 6 (six) hours as needed for Pain or Fever. (Patient not taking: Reported on 2/12/2024) 40 tablet 0          Assessment & Plan    1. Encounter to establish care with new doctor  Thorough history obtained, physical exam as planned and also multiple issues addressed and outlined below.    2. Annual physical exam  - EKG 12 Lead to be performed at Hospital/Lab; Future  - Microalb/Creat Ratio, Random Urine; Future  - Urinalysis, Routine; Future  - CBC; Future  - TSH W Reflex To Free T4; Future  - CMP; Future  - POC Glycohemoglobin [[id number]]  Patient here for physical exam and due for following.  Plan:  -order the following Labs: CBC, CMP, Lipid Panel, A1C (Last A1c value was 4.6% done 4/29/2024), TSH, Vitamin D,  -Vaccines ordered in clinic (Prevnar, Tdap,)       3. Secondary hypertension  - EKG 12 Lead to be performed at Hospital/Lab; Future  - Microalb/Creat Ratio, Random Urine; Future  - Urinalysis, Routine; Future  - CBC; Future  - TSH W Reflex To Free T4; Future  - CMP; Future  - losartan 100 MG Oral Tab; Take 1 tablet (100 mg total) by mouth at bedtime.  Dispense: 90 tablet; Refill: 0  Patient with type hypertension ongoing for years was never previously on medication.  Despite being seen by multiple other physicians previously.  Plan  - Obtain baseline EKG, urinalysis with microalbumin creatinine ratio, CBC, TSH and CMP.  - Start losartan 100 mg p.o. at bedtime.  Patient to obtain a blood pressure cuff and control his/monitor his blood pressure in the morning bring a log next week to clinic and we will reevaluate and dose titrate accordingly.    4. Need for tetanus, diphtheria, and acellular pertussis (Tdap)  vaccine  - TdaP (Boostrix) Vaccine (> 7 Y)  Tdap and Prevnar given today in clinic  5. Immunization due  - PCV20 (Prevnar 20)  Tdap and Prevnar given today in clinic    6. Gout involving toe, unspecified cause, unspecified chronicity, unspecified laterality  - Uric Acid; Future  Patient with chronic gout bilateral first metatarsals not currently on allopurinol.  This is heavy alcohol use disorder.  Educated patient's on abstinence see management as of below.    7. Fever of unknown origin (FUO)  - Vitamin D [E]; Future  - POC Glycohemoglobin [[id number]]  - Oncology/Hematology Referral - In Network  Patient with unknown fever of origin and unintentional weight loss of 18 pounds over the last 2 to 3 weeks along with radiologist A1c is within normal limits of 4.8.  Other etiology unknown at this time.  Could be autoimmune given history of gout, unclear if there is a link with rickets however unlikely.  Does have follow-up heavy alcohol use disorder which may be contributing.  However he has been doing this for over 7 years of heavy use.  Warrants further investigation  Plan:  -Pt ordered TB, blood culture, HIV, Immunofixation, UPEP and other labs  -CT chest/abd/pelvis ordered  -follow up next week    8. Rickets, X-linked hypophosphatemic (HCC)  - PTH, Intact [E]; Future  - Vitamin D [E]; Future  - POC Glycohemoglobin [[id number]]  .agnes  9. Unintentional weight loss of 1-2% body weight within 1 week  - CT CHEST+ABDOMEN+PELVIS(ALL CNTRST ONLY)(ZDF=[id number]); Future  - Quantiferon TB Plus  - Blood Culture  - HIV Ag/Ab Combo  - Protein Electrophoresis w/ ROSALBA & IgA,IgG, IgM Serum; Future  - Bence Carrasco Protein, Random Urine; Future  - Vitamin D [E]; Future  - POC Glycohemoglobin [[id number]]  - Oncology/Hematology Referral - In Network    10. Insomnia, unspecified type  Pt with insomnia ongoing for years when is not drinking  Plan:  -continue to monitor and pending labs    11. Alcohol use disorder, severe, dependence (HCC)  -  naltrexone 50 MG Oral Tab; Take 1 tablet (50 mg total) by mouth at bedtime.  Dispense: 90 tablet; Refill: 1  - [facility] Referral - In Network  Pt with heavy alcohol use dependency since 20y/o (vodka and beer)  Plan;  Check LFT's and EKG if stable start naltrexone 50mg nightly  -if adherence and relapse and issue would be candidate for Vivitrol and refer to addiction medication for dosing and administration if does not tolerate po naltrexon.         Follow Up:   Return in about 1 week (around 5/6/2024).      Jonas Trevizo MD  Internal Medicine      Patient asked to sign release of information for outside records if not already requested, make future office/imaging appointments at the  prior to leaving, and to sign up for [employer] if not already active.  Preventive measures and further education discussed with patient as per after visit summary. Potential medication side effects discussed. All questions answered to best of ability.   Call office with any questions. Seek emergency care if necessary.   Patient understands and agrees to follow directions and advice.      ----------------------------------------- PATIENT INSTRUCTIONS-----------------------------------------     Patient Instructions     What Is High Blood Pressure?  High blood pressure (hypertension) is known as the silent killer. This is because most of the time it doesn’t cause symptoms. In fact, many people don’t know they have it until other health problems develop.  High blood pressure is diagnosed when blood pressure readings taken at several different times show levels higher than 130/80 mmHg. Healthy changes can help lower blood pressure. But once you have high blood pressure, you'll need to manage it for the rest of your life.  Understanding blood pressure  The circulatory system is made up of the heart and blood vessels that carry blood through the body. Your heart is the pump for this system. With each heartbeat (contraction), the heart  sends blood out through large blood vessels called arteries. Blood pressure is a measure of how hard the moving blood pushes against the walls of the arteries.  High blood pressure can harm your health  In a healthy artery, the blood moves smoothly and puts normal pressure on its walls.    High blood pressure results from blood pushing too hard against artery walls. This damages the walls, which form scar tissue as they heal. The scar tissue makes the arteries stiff and weak. Plaque also sticks to the scar tissue, making arteries narrower and harder..  High blood pressure:  Causes your heart to work harder to get blood out to the rest of your body  Raises your risk for heart attack, heart failure, and stroke  Can lead to kidney disease and blindness  Measuring blood pressure  It's important to know your blood pressure numbers. A blood pressure reading is given as two numbers, such as 120/70. The top number is the pressure of blood against the artery walls during a heartbeat (systolic). The bottom number is the pressure of blood against artery walls between heartbeats (diastolic).  Blood pressure categories are:  Normal: lower than 120/lower than 80 mmHg  Elevated (prehypertensive): 120-139/80-89 mmHg  High, Stage 1: 130-139/80-89 mmHg  High, Stage 2: 140/90 mmHg and higher  For most people with high blood pressure, keeping readings lower than 130/80 mmHg may help prevent health problems. Talk with your healthcare provider to find out what your blood pressure goals should be. Bring up any questions or concerns you have about your readings.  Controlling blood pressure  If your blood pressure is too high, work with your healthcare provider to make a plan for lowering it. They may prescribe medicine to help control your blood pressure if lifestyle changes aren't enough.  Below are changes you can make to help lower your blood pressure:  Choose heart-healthy foods. Ask your provider about the Dietary Approaches to Stop  Hypertension (DASH) eating plan. DASH limits sodium (salt) and offers lots of fruits and vegetables, low-fat or nonfat dairy products, whole grains, and other foods high in fiber and low in fat. This plan also includes an increased amount of potassium, which can help lower blood pressure.  Reduce sodium. Eating less sodium reduces fluid retention. Fluid retention caused by too much salt increases blood volume and blood pressure. The American Heart Association (AHA) says to have no more than 1,500 mg sodium a day. But because Americans eat so much salt, the AHA says cutting back to even 2,300 mg a day offers benefits.  Stay at a healthy weight. Being overweight makes you more likely to have high blood pressure. Losing excess weight helps lower blood pressure.  Exercise regularly. Daily exercise helps your heart and blood vessels work better and stay healthier. It can help lower your blood pressure.  Do not smoke. Smoking increases blood pressure and damages blood vessels.  Limit alcohol. Drinking too much alcohol can raise blood pressure. Men should have no more than 2 drinks a day. Women should have no more than 1 a day. A drink is equal to 1 beer, a small glass of wine, or a shot of liquor.  Control stress. Stress makes your heart work harder and beat faster. Managing stress in a healthy way helps you control your blood pressure.  Facts about high blood pressure  Feeling OK doesn't mean your blood pressure is under control. Likewise, feeling bad doesn’t mean it’s out of control. The only way to know for sure is to check your pressure regularly.  Medicine is only one part of controlling high blood pressure. You also need to manage your weight, get regular exercise, and adjust your eating habits.  High blood pressure is often a lifelong health problem. But it can be controlled with lifestyle changes and medicine.  Hypertension isn't the same as stress. Although stress may be a factor in high blood pressure, it’s only  one part of the story.  Blood pressure medicines need to be taken every day. Stopping suddenly may cause a dangerous increase in pressure.    [employer] last reviewed this educational content on 2/1/2022 © 2000-2023 [employer]. All rights reserved. This information is not intended as a substitute for professional medical care. Always follow your healthcare professional's instructions.        What Is a Normal Blood Pressure?  The Joint National Committee on Prevention, Detection, Evaluation, and Treatment of High Blood Pressure has classified blood pressure measurements into several categories:  Normal blood pressure is systolic pressure less than 120 and diastolic pressure less than 80 mmHg.   \"Prehypertension\" is systolic pressure of 120-139 or diastolic pressure of 80-89 mmHg.   Stage 1 Hypertension is blood pressure greater than systolic pressure of 140-159 or diastolic pressure of 90-99 mmHg or greater.   Stage 2 Hypertension is systolic pressure of 160 or greater or diastolic pressure of 100 or greater.  What Health Problems Are Associated With High Blood Pressure?  Several potentially serious health conditions are linked to high blood pressure, including:  Atherosclerosis: a disease of the arteries caused by a buildup of plaque, or fatty material, on the inside walls of the blood vessels; hypertension contributes to this buildup by putting added stress and force on the artery walls.   Heart Disease: Heart failure (the heart is not strong enough to pump blood adequately), ischemic heart disease (the heart tissue doesn't get enough blood), and hypertensive hypertrophic cardiomyopathy (thickened, abnormally functioning heart muscle) are all associated with high blood pressure.   Kidney Disease: Hypertension can damage the blood vessels and filters in the kidneys, so that the kidneys cannot excrete waste properly.   Stroke: Hypertension can lead to stroke, either by contributing to the process of  atherosclerosis (which can lead to blockages and/or clots), or by weakening the blood vessel wall and causing it to rupture.   Eye Disease: Hypertension can damage the very small blood vessels in the retina.  Bleeding from the aorta, the large blood vessel that supplies blood to the abdomen, pelvis, and legs   Heart failure   Poor blood supply to the legs  Erectile Dysfunction  Problems with your vision    Overview  \"Blood pressure\" is the force of blood pushing against the walls of the arteries as the heart pumps blood. If this pressure rises and stays high over time, it can damage the body in many ways.  About 1 in 3 adults in the [place] has HBP. The condition itself usually has no signs or symptoms. You can have it for years without knowing it. During this time, though, HBP can damage your heart, blood vessels, kidneys, and other parts of your body.  Knowing your blood pressure numbers is important, even when you're feeling fine. If your blood pressure is normal, you can work with your health care team to keep it that way. If your blood pressure is too high, treatment may help prevent damage to your body's organs.    By [facility] staff   DASH stands for Dietary Approaches to Stop Hypertension. The DASH diet is a lifelong approach to healthy eating that's designed to help treat or prevent high blood pressure (hypertension). The DASH diet encourages you to reduce the sodium in your diet and eat a variety of foods rich in nutrients that help lower blood pressure, such as potassium, calcium and magnesium.   Lifestyle Modification:   (AVG. SBP = Average Systolic Blood Pressure)  Lifestyle Modification Recommendations  Modification Recommendation Avg. SBP Reduction Range   Weight Reduction Maintain normal body weight (body mass index 18.5-24.9 kg/m2) 5-20 mmHg/10 kg   DASH eating plan Adopt a diet rich in fruits, vegetables, and low fat dairy products with reduced content of saturated and total fat. 8-14 mmHg    Dietary sodium reduction Reduce dietary sodium intake to <= 100 mmol per day (2.4 g sodium or 6 g sodium chloride) 2-8 mmHg   Aerobic physical activity Regular aerobic physical activity (e.g., brisk walking, light jogging, cycling, swimming, etc.) for a goal of at least 150 minutes per week. 4-9 mmHg   Moderation of alcohol consumption Men: limit to <= 2 drinks* per day.  Women and lighter weight persons: limit to <= 1 drink* per day. 2-4 mmHg      * 1 drink = ½ oz or 15 mL ethanol (e.g. 12 oz beer, 5 oz wine, 1.5 oz 80-proof whiskey.   Effects are dose and time dependent.         Jacoby Yoder Jr. is a 29 year old male who has an elevated blood pressure reading at visit today.   He does not check home blood pressures.  He denies chest pain, dizziness, dyspnea, headaches, orthopnea, and palpitations.  He is exercising and does restrict his sodium intake.       He has has a history of hypertension and is currently on medication (see below)  Unusual circumstances today include: none    Blood Pressure and Cardiac Medications            losartan 100 MG Oral Tab             BP Readings from Last 3 Encounters:   04/29/24 (!) 148/91   04/26/24 (!) 175/122   02/05/24 (!) 173/101      Body mass index is 29.86 kg/m².   ASSESSMENT:   See encounter diagnosis  Discussion: Stage 1 (systolic 140 to 159 mmHg or diastolic 90 to 99 mmHg)  Cardiovascular risk factors: hypertension, male gender, and smoking/ tobacco exposure    PLAN:   Continue current treatment regimen.  Dietary Sodium Restriction  Increased Physical Activity/ Get Regular Aerobic Exercise.  Weight loss.    Patient Education: Reviewed risks of hypertension and principles of treatment.  Counseling time: not applicable.    Return in about 1 week (around 5/6/2024).   Jonas Trevizo MD, 4/29/2024, 8:46 AM

## 2024-04-30 DIAGNOSIS — M90.80: Primary | ICD-10-CM

## 2024-04-30 DIAGNOSIS — E83.31: Primary | ICD-10-CM

## 2024-04-30 LAB
ALBUMIN SERPL ELPH-MCNC: 4.36 G/DL (ref 3.75–5.21)
ALBUMIN/GLOB SERPL: 1.54 {RATIO} (ref 1–2)
ALPHA1 GLOB SERPL ELPH-MCNC: 0.31 G/DL (ref 0.19–0.46)
ALPHA2 GLOB SERPL ELPH-MCNC: 0.79 G/DL (ref 0.48–1.05)
B-GLOBULIN SERPL ELPH-MCNC: 0.96 G/DL (ref 0.68–1.23)
GAMMA GLOB SERPL ELPH-MCNC: 0.78 G/DL (ref 0.62–1.7)
KAPPA LC FREE SER-MCNC: 1.96 MG/DL (ref 0.33–1.94)
KAPPA LC FREE/LAMBDA FREE SER NEPH: 1.08 {RATIO} (ref 0.26–1.65)
LAMBDA LC FREE SERPL-MCNC: 1.81 MG/DL (ref 0.57–2.63)
PHOSPHATE SERPL-MCNC: 1.5 MG/DL (ref 2.4–5.1)
PROT SERPL-MCNC: 7.2 G/DL (ref 5.7–8.2)
PROT UR-MCNC: 399.2 MG/DL (ref ?–14)

## 2024-04-30 NOTE — PROGRESS NOTES
No action needed if read by pt:  Good morning Jacoby,   Your EKG is normal. This is a good baseline to have on our system should we need to make any medication changes down the line

## 2024-05-01 LAB
M TB IFN-G CD4+ T-CELLS BLD-ACNC: 0.04 IU/ML
M TB TUBERC IFN-G BLD QL: NEGATIVE
M TB TUBERC IGNF/MITOGEN IGNF CONTROL: >10 IU/ML
QFT TB1 AG MINUS NIL: 0.01 IU/ML
QFT TB2 AG MINUS NIL: 0.02 IU/ML

## 2024-05-02 DIAGNOSIS — I10 HYPERTENSION WITH ALBUMINURIA: Primary | ICD-10-CM

## 2024-05-02 DIAGNOSIS — R80.9 HYPERTENSION WITH ALBUMINURIA: Primary | ICD-10-CM

## 2024-05-02 DIAGNOSIS — E55.9 VITAMIN D DEFICIENCY: Primary | ICD-10-CM

## 2024-05-02 RX ORDER — CHOLECALCIFEROL (VITAMIN D3) 125 MCG
1 CAPSULE ORAL DAILY
Qty: 90 CAPSULE | Refills: 3 | Status: SHIPPED | OUTPATIENT
Start: 2024-05-02 | End: 2025-04-27

## 2024-05-02 NOTE — PROGRESS NOTES
Please relay to pt if not read;  Hello, after review of your labs here are your recommendations:    #Gout: your Uric acid level is 8.6. typically we need to keep it below 6 to prevent flare ups. I would recommend Alcohol cessation or at least to limiting to no more than 2 beverages a day. If you have 2 or more flare ups a year might need to cut out alcohol completely and consider starting allopurinol (daily medication to prevent gout attacks)    # CMP: Your comprehensive metabolic panel shows overall stable functioning kidneys (creatinine, GFR), your liver functions are slightly elevated like from alcohol use (AST, ALT, Bilirubin), and electrolytes and PTH and Phosphorous are off but this is expected with rickets. Slight variations in other values such as BUN/Creat, Serum Osm, anion gap, chloride, etc are not of clinical value at this time.       # Lipids/cholesterol: Your LDL is elevated however cholesterol medications are not required because your LDL value is under the cut off of 190. We typically start cholesterol medications after the age of 40 if your 10 year risk, ie the ASCVD, is greater than 5%. We will continue to monitor these values yearly and I will let you know if there are any changes to these recommendations/your value reaches greater than 5%.  Elevated cholesterol/LDL, in the absence of poor diet, is typically genetic. For now, please continue with healthy diet and exercise such as the mediterranean diet. If you prefer to start cholesterol medications despite a score under 5%, let me know so we can discuss further.     # CBC: Your complete blood count shows overal slightly elevated red blood cells, but stable white blood cells, platelets (help you stop bleeding), and hematocrit (thickness of blood),  Slight variations in other values such as RDW/sw, MCH are not of clinical value at this time. No immunofixation or other blood related disorders       # TSH: Your thyroid (TSH) function is normal.     #  Vitamins: Your vitamin D level is low. If not already taking, please start over-the-counter vitamin D3 5000 IU daily with meals to help replace and maintain your Vitamin D level. If you are taking 5,000 units let me know for further dose instructions. We will recheck your Vitamin D level yearly, sooner if clinically indicated such as osteoporosis.     # Diabetes/A1C: Your A1C Last A1c value was 4.6% done 4/29/2024. which shows  no diabetes. We will recheck this value yearly, sooner if clinically indicated.       If you have any questions or concerns in regards to these labs please schedule a follow up and will gladly discuss.   -Dr. Trevizo

## 2024-05-02 NOTE — PROGRESS NOTES
Please relay to pt:  Breanne Dozier your renal function is ok but you are having early signs of kidney impairment due to uncontrolled blood pressure that is causing protein/albumin to be filtered thru the kidneys. It is crucial to take the blood pressure medication in attempt to prevent further and hopefully reverse with medication and lifestyle changes.   Please make sure to return to clinic in about 1 month to make sure your blood pressure is under control

## 2024-05-10 ENCOUNTER — OFFICE VISIT (OUTPATIENT)
Dept: INTERNAL MEDICINE CLINIC | Facility: CLINIC | Age: 29
End: 2024-05-10

## 2024-05-10 VITALS
BODY MASS INDEX: 27.48 KG/M2 | DIASTOLIC BLOOD PRESSURE: 84 MMHG | WEIGHT: 140 LBS | HEIGHT: 60 IN | SYSTOLIC BLOOD PRESSURE: 133 MMHG | HEART RATE: 72 BPM

## 2024-05-10 DIAGNOSIS — E21.3 HYPERPARATHYROIDISM (HCC): ICD-10-CM

## 2024-05-10 DIAGNOSIS — F10.20 ALCOHOL USE DISORDER, SEVERE, DEPENDENCE (HCC): ICD-10-CM

## 2024-05-10 DIAGNOSIS — M90.80: Primary | ICD-10-CM

## 2024-05-10 DIAGNOSIS — E55.9 VITAMIN D DEFICIENCY: ICD-10-CM

## 2024-05-10 DIAGNOSIS — R80.9 HYPERTENSION WITH ALBUMINURIA: ICD-10-CM

## 2024-05-10 DIAGNOSIS — E83.31: Primary | ICD-10-CM

## 2024-05-10 DIAGNOSIS — I10 HYPERTENSION WITH ALBUMINURIA: ICD-10-CM

## 2024-05-10 PROCEDURE — 99215 OFFICE O/P EST HI 40 MIN: CPT | Performed by: STUDENT IN AN ORGANIZED HEALTH CARE EDUCATION/TRAINING PROGRAM

## 2024-05-10 RX ORDER — AMLODIPINE BESYLATE 5 MG/1
5 TABLET ORAL DAILY
Qty: 90 TABLET | Refills: 3 | Status: SHIPPED | OUTPATIENT
Start: 2024-05-10 | End: 2025-05-05

## 2024-05-10 RX ORDER — ERGOCALCIFEROL 1.25 MG/1
50000 CAPSULE ORAL WEEKLY
Qty: 12 CAPSULE | Refills: 1 | Status: SHIPPED | OUTPATIENT
Start: 2024-05-10 | End: 2024-11-06

## 2024-05-10 RX ORDER — NALTREXONE HYDROCHLORIDE 50 MG/1
50 TABLET, FILM COATED ORAL NIGHTLY
Qty: 90 TABLET | Refills: 3 | Status: SHIPPED | OUTPATIENT
Start: 2024-05-10 | End: 2025-05-05

## 2024-05-10 NOTE — PROGRESS NOTES
OFFICE NOTE     Patient ID: Jacoby Yoder Jr. is a 29 year old male.  Today's Date: 05/10/24  Chief Complaint: Lab Results      Pt is a 30y/o male with PMHx of Hypertension with albuminuria, [place] X-Linked hypophosphatemic, Hyperparathyroidism, Vitamin D def, chronic gout, chronic heavy alcohol use (1.5 handle vodka weekly sober since tue 5/7/2024), weight loss stopped recently, presenting for follow up on blood work      133/84 today in clinic, taking losartan 100mg daily since last encounter      Sober from alcohol Tuesday and nothing to smoke since then. ( 1/5 handle vodka a week previously) naltrexone was on backorder    Pt also states with his rickets he has had chronic bone pain (left femur fracture and bowing of long bones and right femur crack)          Vitals:    05/10/24 0859 05/10/24 0906   BP: (!) 146/97 133/84   Pulse: 73 72   Weight: 140 lb (63.5 kg)    Height: 4' 9\" (1.448 m)      body mass index is 30.3 kg/m².  BP Readings from Last 3 Encounters:   05/10/24 133/84   04/29/24 (!) 148/91   04/26/24 (!) 175/122     The ASCVD Risk score (Brandan REYES, et al., 2019) failed to calculate for the following reasons:    The 2019 ASCVD risk score is only valid for ages 40 to 79      Medications reviewed:  Current Outpatient Medications   Medication Sig Dispense Refill    ergocalciferol 1.25 MG (13104 UT) Oral Cap Take 1 capsule (50,000 Units total) by mouth once a week. 12 capsule 1    amLODIPine 5 MG Oral Tab Take 1 tablet (5 mg total) by mouth daily. 90 tablet 3    naltrexone 50 MG Oral Tab Take 1 tablet (50 mg total) by mouth at bedtime. 90 tablet 3    losartan 100 MG Oral Tab Take 1 tablet (100 mg total) by mouth at bedtime. 90 tablet 0    ibuprofen 600 MG Oral Tab Take 1 tablet (600 mg total) by mouth every 6 (six) hours as needed for Pain or Fever. (Patient not taking: Reported on 2/12/2024) 40 tablet 0         Assessment & Plan    1. Rickets, X-linked hypophosphatemic (HCC) (Primary)  -      ENDOCRINOLOGY - INTERNAL  -     Vitamin D (Ergocalciferol); Take 1 capsule (50,000 Units total) by mouth once a week.  Dispense: 12 capsule; Refill: 1  -     Calcium, 24Hr Urine; Future; Expected date: 08/10/2024  -     Comp Metabolic Panel (14); Future; Expected date: 08/10/2024  -     PTH, Intact; Future; Expected date: 08/10/2024  -     Phosphorus; Future; Expected date: 08/10/2024  -     US THYROID W/PARA-THYRIOD (CPT=76536); Future; Expected date: 05/10/2024  Pt with Phx of Rickets, Vitamin D def, and Hyperparathyroidism (likely with rickets) with normal calcium but severely low vitamin D  Plan  -Start Vitamin D 50,000 International units weekly for next 3 months.  -follow up in 3 months after completing vitamin d course above, and recheck CMP< PTH, Phos and obtain urinary Calcium 24hr collection. Likely severe bone pain from rickets/osteomalacia  -obtain screening ultrasound of thyroid and parathyroid  -pt has Pan CT scan, but weight loss stopped and no fever. Told patient should get this done but is non urgent but will leave order if it resumes  -Refer to Endocrine for further evaluation if he needs calcitonin or other endocrine/metabolic explanation    2. Vitamin D deficiency  -     ENDOCRINOLOGY - INTERNAL  -     Vitamin D (Ergocalciferol); Take 1 capsule (50,000 Units total) by mouth once a week.  Dispense: 12 capsule; Refill: 1  -     Calcium, 24Hr Urine; Future; Expected date: 08/10/2024  -     Comp Metabolic Panel (14); Future; Expected date: 08/10/2024  -     PTH, Intact; Future; Expected date: 08/10/2024  -     Phosphorus; Future; Expected date: 08/10/2024  -     US THYROID W/PARA-THYRIOD (CPT=76536); Future; Expected date: 05/10/2024  Plan  As above   3. Hyperparathyroidism (HCC)  -     Calcium, 24Hr Urine; Future; Expected date: 08/10/2024  -     Comp Metabolic Panel (14); Future; Expected date: 08/10/2024  -     PTH, Intact; Future; Expected date: 08/10/2024  -     Phosphorus; Future; Expected  date: 08/10/2024  -      THYROID W/PARA-THYRIOD (CPT=76536); Future; Expected date: 05/10/2024  Plan  As above     4. Alcohol use disorder, severe, dependence (HCC)  -     Naltrexone HCl; Take 1 tablet (50 mg total) by mouth at bedtime.  Dispense: 90 tablet; Refill: 3  Pt is sober from alcohol and tobacco since Tuesday May 7,   Plan;  -encouraged patient on sobriety, and sent naltrexone to take daily to prevent relapse    5. Hypertension with albuminuria  -     amLODIPine Besylate; Take 1 tablet (5 mg total) by mouth daily.  Dispense: 90 tablet; Refill: 3  -     Comp Metabolic Panel (14); Future; Expected date: 08/10/2024  Pt with HTN with albuminuria noted on recent urinarlysis  Plan;  -continue losartan 100mg po daily  -start amlodipine 5mg po daily, goal SBP<130/80  -check bp and keep log  -follow up in 3 months and recheck CMP and microalbumin ratio at that time.       Follow Up: As needed/if symptoms worsen or Return in about 14 weeks (around 8/16/2024) for follow up on vitamin D, calcium and parathyroid..     I spent 43 minutes obtaining pertitent medical history, reviewing pertinent imaging/labs and specialists notes, evaluating patient, discussing differential diagnosis' and various treatment options, reinforcing importance of compliance with treatment plan, and completing documentation.      Objective/ Results:   Physical Exam  Constitutional:       Appearance: He is well-developed.   Cardiovascular:      Rate and Rhythm: Normal rate and regular rhythm.      Heart sounds: Normal heart sounds.   Pulmonary:      Effort: Pulmonary effort is normal.      Breath sounds: Normal breath sounds.   Abdominal:      General: Bowel sounds are normal.      Palpations: Abdomen is soft.   Skin:     General: Skin is warm and dry.   Neurological:      Mental Status: He is alert and oriented to person, place, and time.      Deep Tendon Reflexes: Reflexes are normal and symmetric.        Reviewed:    Patient Active Problem  List    Diagnosis    Vitamin D deficiency    Hyperparathyroidism (HCC)    Hypertension with albuminuria    Rickets, X-linked hypophosphatemic (HCC)    Secondary hypertension    Gout involving toe    Unintentional weight loss of 1-2% body weight within 1 week      No Known Allergies     Social History     Socioeconomic History    Marital status:    Tobacco Use    Smoking status: Former     Current packs/day: 0.00     Average packs/day: (0.5 ttl pk-yrs)     Types: Cigarettes     Quit date: 2024    Smokeless tobacco: Never    Tobacco comments:     3-4 cigarettes a day. Quit date 24   Vaping Use    Vaping status: Never Used   Substance and Sexual Activity    Alcohol use: Not Currently     Comment: stopped on 24* 2 handles a week (screwdrivers and 6-8 cans of beer)    Drug use: Never    Sexual activity: Yes     Partners: Female     Social Determinants of Health     Financial Resource Strain: Not on File (10/7/2022)    [facility] from [facility]     Financial Resource Strain     Financial Resource Strain: 0   Food Insecurity: Not on File (10/7/2022)    Received from [facility]     Food Insecurity     Food: 0   Transportation Needs: Not on File (10/7/2022)    Received from [facility]     Transportation Needs     Transportation: 0   Physical Activity: Not on File (10/7/2022)    Received from [facility]     Physical Activity     Physical Activity: 0   Stress: Not on File (10/7/2022)    Received from [facility]     Stress     Stress: 0   Social Connections: Not on File (10/7/2022)    Received from [facility]     Social Connections     Social Connections and Isolation: 0   Housing Stability: Not on File (10/7/2022)    Received from [facility]     Housing Stability     Housin      Review of Systems   Constitutional: Negative.    Respiratory: Negative.     Cardiovascular: Negative.    Gastrointestinal: Negative.    Musculoskeletal:  Positive for arthralgias, back pain and neck pain.    Skin: Negative.    Neurological: Negative.      All other systems negative unless otherwise stated in ROS or HPI above.       Jonas Trevizo MD  Internal Medicine       Call office with any questions or seek emergency care if necessary.   Patient understands and agrees to follow directions and advice.      ----------------------------------------- PATIENT INSTRUCTIONS-----------------------------------------     There are no Patient Instructions on file for this visit.      The 21st Century Cures Act makes medical notes available to patients in the interest of transparency.  However, please be advised that this is a medical document.  It is intended as a peer to peer communication.  It is written in medical language and may contain abbreviations or verbiage that are technical and unfamiliar.  It may appear blunt or direct.  Medical documents are intended to carry relevant information, facts as evident, and the clinical opinion of the practitioner.

## (undated) NOTE — LETTER
Date & Time: 2/5/2024, 3:01 PM  Patient: Jacoby Yoder Jr.  Encounter Provider(s):    Grecia Falk APRN       To Whom It May Concern:    Jacoby Yoder was seen and treated in our department on 2/5/2024. He can return to work with these limitations: limit use of Left hand, must wear splint until cleared by orthopedist or primary care doctor which ever comes first .    If you have any questions or concerns, please do not hesitate to call.        _____________________________  Physician/APC Signature

## (undated) NOTE — LETTER
[facility] [address] [address]  Dept: [phone]  Dept Fax: [phone]  Loc: [phone]         April 26, 2024    Patient: Jacoby oYder Jr.   YOB: 1995   Date of Visit: 4/26/2024       To Whom It May Concern:    Jacoby Yoder was seen and treated in our [facility] department on 4/26/2024. He may return to work on 4/29/24 .    If you have any questions or concerns, please don't hesitate to call.      Encounter Provider(s):    Ines Edwards APRN     12:26 PM  4/26/2024

## (undated) NOTE — LETTER
5/10/2024          To Whom It May Concern:    Jacoby Beba Durán is currently under my medical care and was seen in the office today.  Please excuse Jacoby for his absence.    Activity is restricted as follows: none.    If you require additional information please contact our office.        Sincerely,    Jonas Trevizo MD

## (undated) NOTE — LETTER
4/29/2024          To Whom It May Concern:    Jacoby Yoder Jr. is currently under my medical care and may not return to work at this time.    He may return to work on Tuesday, April 29th, 2024.  If you require additional information please contact our office.        Sincerely,    Jonas Trevizo MD          Document generated by:  Jonas Trevizo MD

## (undated) NOTE — LETTER
[facility] [address] [address]  Dept: [phone]  Dept Fax: [phone]  Loc: [phone]         April 26, 2024    Patient: Jacoby Yoder Jr.   YOB: 1995   Date of Visit: 4/26/2024       To Whom It May Concern:    Jacoby Yoder was seen and treated in our [facility]mediate Care department on 4/26/2024. He may return to work on 04/24/24 .    If you have any questions or concerns, please don't hesitate to call.      Encounter Provider(s):    Ines Edwards APRN     12:25 PM  4/26/2024

## (undated) NOTE — LETTER
Date & Time: 1/10/2023, 7:20 PM  Patient: Merlin Negron. Encounter Provider(s):    DAVON Lee       To Whom It May Concern:    Manjeet Chappell was seen and treated in our department on 1/10/2023. He should not return to work until 1/13/2023. During working hours, no heavy lifting > 20 lbs, and no excessive walking due to medical condition. Please see your primary care provider or orthopedics for further restrictions, re-evaluation for accommodations and work status paperwork.     If you have any questions or concerns, please do not hesitate to call.        _____________________________  Physician/APC Signature

## (undated) NOTE — LETTER
1/12/2023          To Whom It May Concern:    Diana Garcia. is currently under my medical care and may not return to work for 1 week. If you require additional information please contact our office.         Sincerely,    Alexsandra Medel MD

## (undated) NOTE — LETTER
24      Patient: Jacoby Yoder Jr.  : 1995 Visit date: 2024    Dear Grecia,      I examined your patient in consultation today.    He has a cortical fracture of the base of the proximal phalanx of the left small finger.  We have fashioned an ulnar gutter splint.    Thank you for your kind referral. If I may answer any questions, please feel free to contact me.     Sincerely,   Manjeet Schneider MD     CC:   No Recipients